# Patient Record
(demographics unavailable — no encounter records)

---

## 2020-02-24 NOTE — P.HPOB
History of Present Illness


H&P Date: 20


Chief Complaint: Induction of labor





This is a 22 y.o. female,  2, para 1, with an estimated date of 

confinement of 3/2/2020, estimated gestational age of 39-1/7 weeks, who presents

for induction of labor.  She complains of irregular contractions and pressure.  

Prenatal course has been uncomplicated.





Prenatal labs:


GC/Chlamydia/trich-neg


Hepatitis B surface antigen-neg


RPR-NR


Rubella-immune


Blood type-A neg


Antibody screen-neg


HIV-NR


Hemoglobin-12.8


Random glucose-74


1 hr. GTT-129


Rhogam given at 29 weeks


GBS-neg





OB Hx:  .  Hx 1 vaginal delivery.


Gyn Hx:  Hx GC & chlamydia-treated


Social Hx:  .  Works part-time.








Review of Systems


Constitutional: Denies chills, Denies fever


Eyes: denies blurred vision, denies pain


Ears, nose, mouth and throat: Denies headache, Denies sore throat


Cardiovascular: Denies chest pain, Denies shortness of breath


Respiratory: Denies cough


Gastrointestinal: Reports abdominal pain (irreg. contractions)


Genitourinary: Reports pelvic pain, Reports pregnant


Musculoskeletal: Reports low back pain


Integumentary: Denies pruritus, Denies rash


Neurological: Denies numbness, Denies weakness





Past Medical History


Past Medical History: No Reported History


Additional Past Medical History / Comment(s): OB history: This is her first 

pregnancy and she had prenatal care with Dr Colon since 11 weeks.  A neg, abs 

neg, Rub low immunity, HEp B neg, HIV NR, GBS neg. Abnormal 1hr, normal 3 hr 

GTT.


History of Any Multi-Drug Resistant Organisms: None Reported


Past Surgical History: No Surgical Hx Reported


Past Anesthesia/Blood Transfusion Reactions: No Reported Reaction


Past Psychological History: Anxiety


Smoking Status: Never smoker


Past Alcohol Use History: Occasional


Past Drug Use History: None Reported





- Past Family History


  ** Mother


Family Medical History: CVA/TIA, Diabetes Mellitus, Thyroid Disorder





Medications and Allergies


                                Home Medications











 Medication  Instructions  Recorded  Confirmed  Type


 


Cgv450/Iron/FA/O3/Dha/Epa/Fish 1 each PO 20  History





[Prenatal Multi-Dha Softgel]    








                                    Allergies











Allergy/AdvReac Type Severity Reaction Status Date / Time


 


No Known Allergies Allergy   Verified 18 21:04














Exam


Osteopathic Statement: *.  No significant issues noted on an osteopathic 

structural exam other than those noted in the History and Physical/Consult.





HEENT:  within normal limits


Heart:  regular rate and rhythm


Lungs:  clear to auscultation bilaterally


Abdomen:  , non-tender


Cervix:  1.5 cm/60%/-1.


Fetal heart tones:  140's by doppler


Extremities:  neg. Gloria's





Assessment and Plan


(1) 39 weeks gestation of pregnancy


Status: Acute   Code(s): Z3A.39 - 39 WEEKS GESTATION OF PREGNANCY   SNOMED 

Code(s): 72208672


   


Plan: 





Admission for induction of labor.  Expectant management.  Epidural anesthesia if

desired.

## 2020-02-25 NOTE — P.PROBDLV
Vaginal Delivery Note





- .


Vaginal Delivery Note: 





The patient progressed to complete dilation after oxytocin induction of labor 

and artificial rupture of membranes with thin meconium noted.  She did receive 1

dose of Stadol while in labor.  Once reaching complete, she began pushing.  

Infant's head came to a crown.  With one further push, the infant's head 

delivered across the perineum followed by the anterior shoulder.  Nose and mouth

were bulb suctioned at the perineum.  With one remaining push, the remainder the

infant easily delivered and was placed on mother's abdomen.  Cord was clamped 

and cut and infant was taken to warmer for evaluation.  A viable male infant is 

noted with Apgar scores of 8 at 1 minute and 8 at 5 minutes and infant weight of

8 lbs. 3 oz.  Placenta delivered shortly thereafter, intact, with a three-vessel

cord.  Uterus initially contracted fairly well after oxytocin was given and uter

ine massage was carried out.  A gloved hand was placed within the uterine cavity

and there was noted to be a small placental piece that was removed with a gloved

hand.  Uterus did contract again well and no further tissue was palpated.  

Inspection of the perineum revealed bilateral periurethral abrasions that were 

noted to be hemostatic.  There was a small first-degree perineal laceration.  

This area was anesthetized with 1% lidocaine and then sutured with 3-0 Vicryl 

suture in a running locked stitch.  Estimated blood loss is approximately 150 

mL's.  Both mother and infant are in stable condition.

## 2020-02-25 NOTE — P.PN
Progress Note - Text


Progress Note Date: 02/25/20





I was called back into see the patient approximately an hour after delivery.  

Her nurse stated that every time she would rub fundus, she would express clots. 

She did give Methergine approximately 20 minutes ago.  After performing 

quantitative blood loss estimation, she determine that the blood loss was over 

800 mL at this point.  I came in and examined the patient.  Upon massaging the 

fundus, more clots were expressed.  I placed a gloved hand completely within the

uterine cavity and expressed approximately 150-200 mL of dark blood clots.  I 

felt no further placental tissue.  Uterus did firm up fairly well at this point.

 Will monitor closely at this point.  Patient is aware to notify her nurse if 

she starts feeling dizzy or lightheaded or starts to bleed heavily.

## 2020-02-26 NOTE — P.DS
Providers


Date of admission: 


20 05:40





Expected date of discharge: 20


Attending physician: 


Sara Colon





Primary care physician: 


Stated None








- Discharge Diagnosis(es)


(1) 39 weeks gestation of pregnancy


Current Visit: No   Status: Acute   


Hospital Course: 





This is a 22-year-old female  2 para 1 at 39-2/7 weeks who presented for 

induction of labor.  She underwent oxytocin induction of labor and delivered 

vaginally a viable male infant with Apgar scores of 8 at 1 minute and 8 at 5 

minutes, and infant weight of 8 lbs. 3 oz.  Her postpartum course was initially 

complicated by some postpartum uterine atony.  A fairly large amount of blood 

clot was expressed from the intrauterine cavity area she was given Methergine 2 

times.  Shortly after this her uterus did contract well and her bleeding was 

minimal.  She denies any lightheadedness or dizziness this morning.  Vital signs

are stable.  Abdomen is soft with fundus firm and nontender.  Extremities show 

negative Homans.  Impression is status post vaginal delivery postpartum day #1. 

Plan is to discharge home later today.  Routine postpartum instructions are 

given.  She will be given a prescription for ibuprofen.  She is bottle feeding. 

She is advised to call the office if she has any further questions or concerns 

prior to her primary in time.  She is encouraged to continue taking her prenatal

vitamins.


Procedures: 





Oxytocin induction of labor


Spontaneous vaginal delivery of a viable male infant on 2020


Patient Condition at Discharge: Stable





Plan - Discharge Summary


New Discharge Prescriptions: 


New


   Ibuprofen [Motrin] 600 mg PO Q6HR PRN #60 tab


     PRN Reason: Mild Pain Or Fever >= 100.5





No Action


   Ymm257/Iron/FA/O3/Dha/Epa/Fish [Prenatal Multi-Dha Softgel] 1 each PO DAILY


Discharge Medication List





Hfp332/Iron/FA/O3/Dha/Epa/Fish [Prenatal Multi-Dha Softgel] 1 each PO DAILY 

20 [History]


Ibuprofen [Motrin] 600 mg PO Q6HR PRN #60 tab 20 [Rx]








Follow up Appointment(s)/Referral(s): 


Sara Colon DO [Doctor of Osteopathic Medicine] - 6 Weeks


Activity/Diet/Wound Care/Special Instructions: 


Postpartum Instructions





1.  Do not begin any exercise program for 3 weeks.


2.  Do not resume sexual relations for 3 weeks or longer if uncomfortable.


3.  You may take tub baths or showers at any time.


4.  You may use tampons if desired after 3 weeks.


5.  Keep the area of episiotomy (stitches) clean and dry.


6.  If you are not nursing, wear a good fitting, supportive bra during the day 

and limit fluid intake for at least 1 week to prevent breast engorgement.


7.  Call the office, [phone], within the next week to make appointment for your

6 week checkup if it has not already been made.


8.  Report any of the following occurrences to the doctor promptly:


     a.  Heavy, excessive bleeding


     b.  Chills, fever


     c.  Burning or frequency of urination


     d.  Pain or redness and breasts if nursing


     e.  Increasing pain or swelling in episiotomy (stitches).











In addition to the above instructions, the following additional should be 

followed:


1.  No heavy lifting or straining (exercising) until after 6 week checkup.


2.  Keep abdominal incision clean and dry: You may wear a dressing if more 

comfortable.


3.  Make office appointment for 10 days after going home or as instructed by her

doctor.


Discharge Disposition: HOME SELF-CARE